# Patient Record
Sex: FEMALE | Race: ASIAN | Employment: UNEMPLOYED | URBAN - METROPOLITAN AREA
[De-identification: names, ages, dates, MRNs, and addresses within clinical notes are randomized per-mention and may not be internally consistent; named-entity substitution may affect disease eponyms.]

---

## 2022-10-13 ENCOUNTER — HOSPITAL ENCOUNTER (OUTPATIENT)
Dept: ULTRASOUND IMAGING | Age: 37
Discharge: HOME | End: 2022-10-13

## 2022-10-13 ENCOUNTER — PRE-PROCEDURE TELEPHONE (OUTPATIENT)
Dept: ULTRASOUND IMAGING | Age: 37
End: 2022-10-13

## 2022-10-13 VITALS
DIASTOLIC BLOOD PRESSURE: 69 MMHG | TEMPERATURE: 98.2 F | RESPIRATION RATE: 18 BRPM | SYSTOLIC BLOOD PRESSURE: 92 MMHG | WEIGHT: 106 LBS | HEART RATE: 91 BPM

## 2022-10-13 DIAGNOSIS — O30.032 MONOCHORIONIC DIAMNIOTIC TWIN GESTATION IN SECOND TRIMESTER: ICD-10-CM

## 2022-10-13 DIAGNOSIS — O30.042 DICHORIONIC DIAMNIOTIC TWIN PREGNANCY IN SECOND TRIMESTER: ICD-10-CM

## 2022-10-13 PROCEDURE — 76821 MIDDLE CEREBRAL ARTERY ECHO: CPT

## 2022-10-13 PROCEDURE — 99211 OFF/OP EST MAY X REQ PHY/QHP: CPT

## 2022-10-13 PROCEDURE — 76816 OB US FOLLOW-UP PER FETUS: CPT

## 2022-10-13 NOTE — FLOWSHEET NOTE
Pt arrives ambulatory to [facility] for scheduled MFM appointment. Pt shown to room. Pt reports feeling both babies move well. Denies any abdominal pain or regular contractions. Denies vaginal bleeding or leaking of fluid. Denies any other OB related concerns. Ob hx, medications, and allergies reviewed.

## 2022-10-13 NOTE — PROGRESS NOTES
This RN called patient at 46- 10/13/22 to notify of patient's follow up appointments for MFM    2 week follow up for MCA doppler and limited scan on 10/27/22 @ 1030 am, at [place] location  4 week follow up with MFM here at [address] on Nov 10 @ 10:00 am  6 week follow up for MCA doppler and limited scan on 11/22/22 at 1030 am at [facility] location. Pt provided with addresses for locations as well. Pt voiced understanding. Denies any concerns.

## 2022-10-13 NOTE — PROCEDURES
[facility]/Women's Imaging Ultrasound Report    Patient name: Mily Dress Age: 39 y.o. : 1985     Date of Service: [id number]    CLEO: Estimated Date of Delivery: None noted. Gestational Age: 32 3/7 weeks     OB Hx: G 2 P 1    Indication: This is a f/u growth and TTTS for monochorionic diamniotic twins. Scan type: Follow- up Growth and MCA Dopplers    Fetus #: 2     Presentation: cephalic/transverse  Fetal Heart Rate: A-155/B-136 bpms   Placental location: posterior  Amniotic fluid: within normal limits             P. Cord Insertion: n/a  Single Deepest Pocket: A-2.46/B-3.78 cm    Biometry:  Baby A  Biparietal Diameter    69.4 mm consistent with 27 weeks and 6 days at the 54th percentile  Head Circumference   248.1 mm consistent with 27 weeks and 0 days at the 10th percentile  Abdominal Circumference  223.1 mm consistent with 26 weeks and 5 days at the 21 percentile  Femur Length    48.7 mm consistent with 26 weeks and 3 days at the 10th percentile  Humerus Length   43.8 mm consistent with 26 weeks and 0 days at the 10th percentile    Baby B    Biparietal Diameter    69.7 mm consistent with 28 weeks and 0 days at the 58th percentile  Head Circumference   254 mm consistent with 27 weeks and 4 days at the 25th percentile  Abdominal Circumference  227.8 mm consistent with 27 weeks and 1 day at the 33rd percentile  Femur Length    49.7 mm consistent with 26 weeks and 5 days at the 18th percentile  Humerus Length   43.7 mm consistent with 26 weeks at the 9th percentile    Fetal weights:            Baby A: 970 g at the 15th percentile   Baby B: 1030 g at the 26 percentile      Anatomy:  Fetal anatomy was previously performed and not repeated today. The calvarium, intracranial anatomy, fetal cavum, lateral ventricle, cerebellum, four-chamber heart, RVOT, LVOT, diaphragm, stomach, bladder, kidneys, and three-vessel cord were again visualized today on baby A and found to be within normal limits. The calvarium, intracranial anatomy, fetal cavum, lateral ventricle, midface, four-chamber heart, RVOT, LVOT, diaphragm, stomach, bladder, and kidneys were visualized again today on baby B and within normal limits. MCA Dopplers are within normal limits for gestational age. A: PSV-32.1 cm/s at 1 MOM  B: PSV-43.2 cm/s at 1.29 MOM        Cervix/Uterus/Adnexa:  Not applicable    There is no evidence of TTTS nor anemia/polycythemia. Fetal growth is appropriate for each twin. Growth is concordant. Twin presentation is cephalic/transverse. Placenta is posterior. Amniotic fluid volume is within normal limits x2 by [facility]. Thank you for sending your patient for f/u ultrasound. Patient will need f/u MCAs in two weeks and twin growth in four weeks.      Sanjuana Holland MD

## 2022-10-13 NOTE — FLOWSHEET NOTE
Pt needed to leave prior to be given discharge instructions or follow up appointment time with [facility] on November 10. Pt will need to be called with those appointment times. In addition, pt will need to be scheduled and seen at [facility] with [facility] for MCA dopplers and limited scan for twin gestation.

## 2022-11-10 ENCOUNTER — HOSPITAL ENCOUNTER (OUTPATIENT)
Dept: ULTRASOUND IMAGING | Age: 37
Discharge: HOME | End: 2022-11-10

## 2022-11-10 VITALS
DIASTOLIC BLOOD PRESSURE: 72 MMHG | OXYGEN SATURATION: 98 % | RESPIRATION RATE: 18 BRPM | SYSTOLIC BLOOD PRESSURE: 102 MMHG | HEART RATE: 123 BPM | TEMPERATURE: 98 F

## 2022-11-10 DIAGNOSIS — O30.033 MONOCHORIONIC DIAMNIOTIC TWIN GESTATION IN THIRD TRIMESTER: ICD-10-CM

## 2022-11-10 PROCEDURE — 76821 MIDDLE CEREBRAL ARTERY ECHO: CPT

## 2022-11-10 PROCEDURE — 76816 OB US FOLLOW-UP PER FETUS: CPT

## 2022-11-10 PROCEDURE — 99211 OFF/OP EST MAY X REQ PHY/QHP: CPT

## 2022-11-10 PROCEDURE — 99213 OFFICE O/P EST LOW 20 MIN: CPT | Performed by: OBSTETRICS & GYNECOLOGY

## 2022-11-10 NOTE — FLOWSHEET NOTE
All discharge instructions reviewed with patient. All questions answered. Pt verbalized understanding. Pt off unit ambulatory with no signs of distress.

## 2022-11-10 NOTE — FLOWSHEET NOTE
Pt arrives to unit for scheduled US with MFM. Pt shown to LT-05. VS obtained. History and medications reviewed. Pt states feeling good fetal movement. Denies vaginal bleeding, leaking of fluid, and abdominal tenderness. Pt has no complaints at this time.

## 2022-11-10 NOTE — PROGRESS NOTES
MATERNAL FETAL MEDICINE  11/10/2022     Referring Clinicians:  Delmi Julio MD and Lesli LARA    Indications:  Monochorionic/diamniotic twins    History: This 43-year-old  2 para 1-0-0-1 has an EDC of 2023 and a EGA of 31 weeks and 3 days. She is referred for monochorionic diamniotic twins. She voices no complaints. She had a prior child 13 months ago. The child weighed 6+ pounds she pushed for 4.5 hours with that pregnancy. Is here today for follow-up growth sonography and middle cerebral artery Doppler studies to make sure there is no evidence of twin-twin transfusion syndrome. Exam: Blood pressure 99/64  Weight 112 pounds  BMI = 23  No costovertebral angle tenderness  Uterus non-tender    Ultrasound Findings: We identified monochorionic/diamniotic twins. The presenting twin was in the vertex presentation and the superior twin was in a transverse lie with the fetal head to the maternal left side. Both twins had adequate amniotic fluid around them, with single deepest vertical pockets of 4.58 cm and 4.05 cm, respectively. Both twins had four-chamber fetal hearts and the heart rates were 157 bpm and 138 bpm, respectively. Both fetuses had three-vessel umbilical cords. Both had stomachs and bladders that were visualized. We performed middle cerebral artery peak systolic velocities, in order to the make certain that there was no evidence of twin-twin transfusion. Both peak systolic velocities were between 1.29 and 1.5 multiples of the median. This is normal.  Biometrics confirmed her EDC and estimated fetal weights were 1576 grams and 1583 grams, respectively. There was a 0.4% difference in estimated fetal weights. Impressions:  Intrauterine pregnancy at 31 weeks 3 days with an [facility] of 2023  2. Monochorionic diamniotic twins  3. Concordant twins  4. According to normal peak systolic velocities of the middle cerebral arteriesof both twins        5. Normal blood pressures        6. Normal amniotic fluid around both twins    Recommendations  1. Prenatal care and delivery with Dr. Mackenzie Byrd  2. Patient to have peak systolic velocities and amniotic fluid pockets checked in [place] in 2 weeks  3. Reassess fetal weights and repeat systolic velocities in 4 weeks in [facility]  4. Patient should have nonstress testing beginning at 32 weeks of gestation. This can be carried out in [facility]. 5.  Delivery plans should be established and carried out by Dr. Mackenzie Byrd.  6. In monochorionic diamniotic twins, we generally encourage delivery around 36 weeks gestation if everything is doing well.

## 2022-12-02 ENCOUNTER — HOSPITAL ENCOUNTER (OUTPATIENT)
Age: 37
Discharge: HOME | End: 2022-12-02
Attending: OBSTETRICS & GYNECOLOGY | Admitting: OBSTETRICS & GYNECOLOGY

## 2022-12-02 VITALS
HEART RATE: 72 BPM | TEMPERATURE: 97.6 F | SYSTOLIC BLOOD PRESSURE: 98 MMHG | RESPIRATION RATE: 15 BRPM | DIASTOLIC BLOOD PRESSURE: 61 MMHG | OXYGEN SATURATION: 100 %

## 2022-12-02 PROCEDURE — 59025 FETAL NON-STRESS TEST: CPT

## 2022-12-02 NOTE — FLOWSHEET NOTE
EFMs and toco off discharge instructions given and signed voiced understanding. [facility] has an appointment on Monday at [facility] and MFM appointment on Thursday. 1020 Left ambulatory from the [facility] with  in stable condition.

## 2022-12-02 NOTE — DISCHARGE INSTRUCTIONS
LABOR    Call your doctor if you have these signs:     Regular tightening of the uterus coming as often as once every 15 minutes     Menstrual-like cramps, they may be regular, or may be continuous and move to   your back. A low, dull backache different from what you normally experience. It may come and go. Vaginal leaking of fluid. Any bleeding from your vagina. Pain, burning or bleeding when you urinate. LABOR    Call your doctor, or come to the hospital, if you have:    Contractions coming about every 10-15 minutes apart, for about one hour. Labor contractions are usually strong enough that you can not walk or talk while having the contractions. ( Contractions can feel like menstrual cramps, tightening in your abdomen, rectal pressure or a backache. This feeling comes and goes.)    Vaginal leaking of fluid. Heavy, bright red bleeding, like the days of your period. ( A little bloody show or spotting is normal in labor.)    ALWAYS CALL YOUR DOCTOR IF YOU:    Notice decreased movement of your baby, different from what you are used to. Have heavy, bright red bleeding, like the heavy days of your periods. Have vaginal leaking of fluid. Keep your next appointment. ____As scheduled_________________________________________    Activity ___As tolerated______________      Diet____As tolerated________________         Learning About Twin Pregnancy  What is different about a twin pregnancy? In many ways, a twin pregnancy is like a single-baby pregnancy. Healthy twins develop like a single baby does until the last trimester, when their growth slows down. Twins are usually born before the usual 40-week due date. For the mother, carrying twins can be more difficult than carrying a single baby. And her risks are higher for pregnancy problems. That's why keeping up with prenatal checks and tests is especially important. How do twins grow?   Twins develop from embryos to babies like a single baby does. By weeks 10 to 14 of your pregnancy, one or two placentas have formed inside your uterus. It is possible to hear your babies' heartbeats with a special ultrasound device. Your babies' eyes can move. Their arms and legs can bend. Around weeks 14 to 18, hair is beginning to grow on your babies' heads. By 18 to 22 weeks, your babies can now suck their thumbs. Around this time, you may start to feel your babies move. At first, this might feel like fluttering or \"butterflies. \"  Around week 26, your babies can open and close their eyelids. You may notice that they respond to the sound of your or your partner's voice. You may also notice that they do less turning and twisting and more squirming. Up to 32 weeks, twins grow rapidly. By this point, they really start to look like babies, with hair and plump skin. Around 32 to 34 weeks, twins' pace of growth slows down. Their lungs become more ready for breathing. This marks a stage when babies born early are less likely to have breathing problems after birth. What can you expect during a twin pregnancy? With a twin pregnancy, your body makes high levels of pregnancy hormones. So morning sickness may come on earlier and stronger than if you were carrying a single baby. You may also have earlier and more intense symptoms from pregnancy, like swelling, heartburn, leg cramps, bladder discomfort, and sleep problems. Your belly may grow bigger, and you may gain more weight, sooner. Talk to your doctor about how much you might expect to gain. When you're carrying twins, you and your babies may be tested and checked more than you would for a single-baby pregnancy. At about 10 weeks, an ultrasound can show if the babies are growing in the same amniotic sac. If they each have their own sac and their own placenta, twins have the best chances of both growing well. Between weeks 18 and 20, an ultrasound may be able to show the sex of your babies.   Later in your pregnancy, you will start to have checkups more often. This will be sooner than for a single-baby pregnancy. Twins tend to be born early, but 37 weeks is a goal when mother and babies are doing well. As you get closer to delivery time, your medical team will help you know what to expect and what to do. As questions come to mind, keep a list so you can remember to ask your doctor. Follow-up care is a key part of your treatment and safety. Be sure to make and go to all appointments, and call your doctor if you are having problems. It's also a good idea to know your test results and keep a list of the medicines you take. Where can you learn more? Go to https://[employer]web[employer]. org and sign in to your [employer] account. Enter Z442 in the [employer] box to learn more about \"Learning About Twin Pregnancy. \"     If you do not have an account, please click on the \"Sign Up Now\" link. Current as of: February 23, 2022               Content Version: 13.4  © 2006-2022 [employer]wise, Incorporated. Care instructions adapted under license by [facility] ([facility]). If you have questions about a medical condition or this instruction, always ask your healthcare professional. [address] any warranty or liability for your use of this information.

## 2022-12-02 NOTE — FLOWSHEET NOTE
Presents ambulatory to the [facility] for NST. Oriented to LT03. TR to DR. Luis Beltre notified of patients arrival for NST with twins. Orders received and for discharge with reactive NST.  0930  EFMs and toco applied FHR A-140 +FM FHR B-145 +FM abdomen soft non tender. Denies feeling contractions at this time. States that she does feel abdomen getting tight but is not feeling pain. OB hx and vs obtained. POC discussed voiced understanding. Call light in reach.

## 2022-12-05 ENCOUNTER — HOSPITAL ENCOUNTER (OUTPATIENT)
Age: 37
Discharge: HOME | End: 2022-12-05
Attending: OBSTETRICS & GYNECOLOGY | Admitting: OBSTETRICS & GYNECOLOGY

## 2022-12-05 VITALS
RESPIRATION RATE: 18 BRPM | OXYGEN SATURATION: 100 % | TEMPERATURE: 98.2 F | HEART RATE: 78 BPM | SYSTOLIC BLOOD PRESSURE: 102 MMHG | DIASTOLIC BLOOD PRESSURE: 55 MMHG

## 2022-12-05 PROCEDURE — 59025 FETAL NON-STRESS TEST: CPT | Performed by: ADVANCED PRACTICE MIDWIFE

## 2022-12-05 PROCEDURE — 59025 FETAL NON-STRESS TEST: CPT

## 2022-12-05 RX ORDER — ONDANSETRON 2 MG/ML
4 INJECTION INTRAMUSCULAR; INTRAVENOUS EVERY 6 HOURS PRN
Status: DISCONTINUED | OUTPATIENT
Start: 2022-12-05 | End: 2022-12-05 | Stop reason: HOSPADM

## 2022-12-05 RX ORDER — ACETAMINOPHEN 325 MG/1
650 TABLET ORAL EVERY 4 HOURS PRN
Status: DISCONTINUED | OUTPATIENT
Start: 2022-12-05 | End: 2022-12-05 | Stop reason: HOSPADM

## 2022-12-05 RX ORDER — ONDANSETRON 4 MG/1
4 TABLET, ORALLY DISINTEGRATING ORAL EVERY 8 HOURS PRN
Status: DISCONTINUED | OUTPATIENT
Start: 2022-12-05 | End: 2022-12-05 | Stop reason: HOSPADM

## 2022-12-05 NOTE — PROGRESS NOTES
Pt admission tab updated, monitors placed. Pt states she feels good fetal movement, denies gush of fluid or vaginal bleeding.

## 2022-12-05 NOTE — PROGRESS NOTES
Department of Obstetrics and Gynecology  Labor and Delivery  TRIAGE NOTE      SUBJECTIVE:    Chief Complaint   Patient presents with    Non-stress Test     Pt reports to triage for NST due to twins and AMA. +FM. Pt denies ctx, VB or LOF. High risk pregnancy for twin gestation and AMA. OBJECTIVE    Vitals:  BP (!) 102/55   Pulse 78   Temp 98.2 °F (36.8 °C) (Oral)   Resp 18   SpO2 100%       CONSTITUTIONAL:  negative  RESPIRATORY:  negative  CARDIOVASCULAR:  negative  GASTROINTESTINAL:  negative  ALLERGIC/IMMUNOLOGIC:  negative  NEUROLOGICAL:  negative  BEHAVIOR/PSYCH:  negative    Fetal heart rate:        Baseline FHR :    135/145 bpm              Fetal Accelerations:  present        Fetal Decelerations:  absent        Fetal Variability:   moderate    Contraction frequency:  Irregular between 3-10 minutes apart     DATA:  No results found for: WBC, HGB, HCT, MCV, PLT    ASSESSMENT:   +NST in third trimester twin pregnancy. Pt on monitor for 20 minutes. PLAN: Pt to be discharged home with PTL precautions.          JANE Lentz CNM

## 2022-12-05 NOTE — DISCHARGE INSTRUCTIONS
OB DISCHARGE INSTRUCTIONS  2022 @ 1:50 PM     Discharge Disposition: *** Home *** May take Tylenol for pain.  Labor  YES  Regular tightening of the uterus coming as often as once every 15 minutes. yes Menstrual-like cramps, they may be regular, or may be continuous and move to your back. YES A low, dull backache different from what you normally experience. It may come and go. YES Vaginal leaking of fluid. YES Any bleeding from your vagina. YES Pain, burning or bleeding when you urinate. Labor  Call your doctor; or come to the hospital, if you have:  YES Contractions coming about every 5 minutes, for about one hour. Labor contractions are usually strong enough that you cannot walk or talk while having contractions. (Contractions can feel like menstrual cramps, tightening in your abdomen, rectal pressure or a backache. This feeling comes and goes.)   YES Vaginal leaking of fluid. YES Heavy, bright red bleeding, like the heavy days of your period.  (A little bloody show is normal in labor.)     Always call your Doctor if you:  YES Notice decreased movement of your baby, different from what you are used to. YES Have heavy, bright red bleeding, like the heavy days of your period. YES Have vaginal leaking of watery fluid. Your next appointment:___Keep your next appointment as scheduled and return to L & D if needed. _________________    Activity:  AS TOLERATED  Diet:  REGULAR  If you have any questions regarding your discharge instructions call us at [phone]  .

## 2022-12-08 ENCOUNTER — HOSPITAL ENCOUNTER (OUTPATIENT)
Dept: ULTRASOUND IMAGING | Age: 37
Discharge: HOME | End: 2022-12-08

## 2022-12-08 VITALS
HEART RATE: 92 BPM | SYSTOLIC BLOOD PRESSURE: 100 MMHG | WEIGHT: 118 LBS | DIASTOLIC BLOOD PRESSURE: 75 MMHG | RESPIRATION RATE: 16 BRPM | OXYGEN SATURATION: 100 % | TEMPERATURE: 97.5 F

## 2022-12-08 DIAGNOSIS — O30.032 MONOCHORIONIC DIAMNIOTIC TWIN GESTATION IN SECOND TRIMESTER: ICD-10-CM

## 2022-12-08 PROCEDURE — 76816 OB US FOLLOW-UP PER FETUS: CPT

## 2022-12-08 PROCEDURE — 76821 MIDDLE CEREBRAL ARTERY ECHO: CPT

## 2022-12-08 NOTE — PROCEDURES
[facility]/Women's Imaging Ultrasound Report    Patient name: Zarina Puckett Age: 39 y.o. : 1985     Date of Service: [id number]    CLEO: Estimated Date of Delivery: 23 Gestational Age: 35w3d OB Hx:     Indication: Mo-Di twins for growth and anemia/polycythemia surveillance    Scan type: Growth/MCAs     Fetus #: 2     Presentation: Vtx/Vtx  Fetal Heart Rate: 143/150   Placental location: Posterior fundal  Amniotic fluid: wnl                                    P. Cord Insertion: non-vis  Single Deepest Pocket: 6.75/3.83cm    Biometry A:  Biparietal Diameter    88.1mm       35 4/7 weeks     62%  Head Circumference   309.1mm     34 4/7 weeks       7%  Abdominal Circumference  302.5mm     34 2/7 weeks     26%  Femur Length    65.9mm       34 0/7 weeks     13%    Lateral Ventricle   6.3mm    Fetal weight:    2398g  40%    Biometry B:  Biparietal Diameter    88.5mm     35 6/7 weeks     68%  Head Circumference   323.1mm   36 4/7 weeks     47%  Abdominal Circumference  310.3mm   35 0/7 weeks     48%  Femur Length    63.3cm       32 5/7 weeks     4 %    Lateral Ventricle   4.0mm    Fetal weight:    2470g  47%    Anatomy:        Fetal anatomy was previously performed and not repeated in entirety today. In a limited survey, the calvarium, lateral ventricle, outflow tracts, diaphragm, stomach, bladder, ventral wall, both kidneys, and three-vessel cord were found to be within normal limits on baby A. Additionally there was a limited four-chamber heart, intracranial anatomy, and midface on baby A. The calvarium, intracranial anatomy, CSP, lateral ventricle, midface, four-chamber heart, outflow tracts, diaphragm, stomach, bladder, antral wall, right kidney and three-vessel cord were seen on baby B. Presentations were cephalic/cephalic. Genders were female.           Other  BPP               Not performed  UA Doppler   Not performed  MCA Doppler   the MCA on baby A had a PSV of 44.5 cm/s that is consistent with greater than 1 multiples of the median; the MCA on baby B had a PSV of 71 cm/s that is consistent with 1.29-1.5 multiples of the median. Both measurements were within normal limits for gestational age. Thank you for sending your patient for an detailed anatomy scan. She is seen for follow-up growth and MCA Dopplers. Biometry and fluid are reassuring for gestational age. Limited anatomy is unremarkable. No further scans currently scheduled given the gestational age and impending delivery. Recommend 2x weekly NSTs until delivery at 36-37 weeks.     Joseph Montoya MD  [facility]

## 2022-12-19 ENCOUNTER — HOSPITAL ENCOUNTER (OUTPATIENT)
Age: 37
Discharge: HOME | DRG: 560 | End: 2022-12-19
Attending: OBSTETRICS & GYNECOLOGY | Admitting: OBSTETRICS & GYNECOLOGY
Payer: MEDICAID

## 2022-12-19 VITALS
BODY MASS INDEX: 25.61 KG/M2 | WEIGHT: 122 LBS | HEIGHT: 58 IN | RESPIRATION RATE: 18 BRPM | TEMPERATURE: 96.4 F | SYSTOLIC BLOOD PRESSURE: 113 MMHG | OXYGEN SATURATION: 100 % | DIASTOLIC BLOOD PRESSURE: 66 MMHG | HEART RATE: 81 BPM

## 2022-12-19 PROCEDURE — 59025 FETAL NON-STRESS TEST: CPT | Performed by: ADVANCED PRACTICE MIDWIFE

## 2022-12-19 PROCEDURE — 59025 FETAL NON-STRESS TEST: CPT

## 2022-12-19 RX ORDER — ACETAMINOPHEN 325 MG/1
650 TABLET ORAL EVERY 4 HOURS PRN
Status: DISCONTINUED | OUTPATIENT
Start: 2022-12-19 | End: 2022-12-19 | Stop reason: HOSPADM

## 2022-12-19 RX ORDER — ONDANSETRON 4 MG/1
4 TABLET, ORALLY DISINTEGRATING ORAL EVERY 8 HOURS PRN
Status: DISCONTINUED | OUTPATIENT
Start: 2022-12-19 | End: 2022-12-19 | Stop reason: HOSPADM

## 2022-12-19 RX ORDER — ONDANSETRON 2 MG/ML
4 INJECTION INTRAMUSCULAR; INTRAVENOUS EVERY 6 HOURS PRN
Status: DISCONTINUED | OUTPATIENT
Start: 2022-12-19 | End: 2022-12-19 | Stop reason: HOSPADM

## 2022-12-19 NOTE — PROGRESS NOTES
Department of Obstetrics and Gynecology  Labor and Delivery  TRIAGE NOTE      SUBJECTIVE:  Non stress test for twin gestation    Pt to triage for NST for high risk pregnancy of twins. +FM. Pt denies VB, LOF. Pt reports contractions every 5-6 minutes, rating 3 out of 10. SVE at last office visit 1cm. OBJECTIVE    Vitals:  BP (!) 114/57   Pulse 75   Temp (!) 96.4 °F (35.8 °C) (Temporal)   Resp 18   Ht 4' 10\" (1.473 m)   Wt 122 lb (55.3 kg)   SpO2 100%   BMI 25.50 kg/m²       CONSTITUTIONAL:  negative  RESPIRATORY:  negative  CARDIOVASCULAR:  negative  GASTROINTESTINAL:  negative  ALLERGIC/IMMUNOLOGIC:  negative  NEUROLOGICAL:  negative  BEHAVIOR/PSYCH:  negative    Membranes:    Intact    Fetal heart rate:        Baseline FHR :    130s/140s bpm              Fetal Accelerations:  present        Fetal Decelerations:  absent        Fetal Variability:   moderate    Contraction frequency:  6 minutes    DATA:  No results found for: WBC, HGB, HCT, MCV, PLT    +NST    ASSESSMENT:   NST for twin pregnancy      PLAN: SVE to assess if patient in labor. Dr. Sena Luke aware.         Oskar Diego, APRN - [place]NAM

## 2022-12-19 NOTE — PROGRESS NOTES
Pt arrives to triage for scheduled NST for [place]-Di Twins. Pt reports positive fetal movement, denies any bleeding or leaking of fluid, and abd soft and nontender to palpation. Pt repots some ctx, not timeable. FHMs and TOCO applied, POC reviewed, call light within reach.

## 2022-12-21 ENCOUNTER — ANESTHESIA EVENT (OUTPATIENT)
Dept: LABOR AND DELIVERY | Age: 37
End: 2022-12-21
Payer: MEDICAID

## 2022-12-21 ENCOUNTER — ANESTHESIA (OUTPATIENT)
Dept: LABOR AND DELIVERY | Age: 37
End: 2022-12-21
Payer: MEDICAID

## 2022-12-21 ENCOUNTER — HOSPITAL ENCOUNTER (INPATIENT)
Age: 37
LOS: 2 days | Discharge: HOME | DRG: 560 | End: 2022-12-23
Attending: OBSTETRICS & GYNECOLOGY | Admitting: OBSTETRICS & GYNECOLOGY
Payer: MEDICAID

## 2022-12-21 PROBLEM — Z3A.37 37 WEEKS GESTATION OF PREGNANCY: Status: ACTIVE | Noted: 2022-12-21

## 2022-12-21 PROBLEM — O09.93 SUPERVISION OF HIGH RISK PREGNANCY IN THIRD TRIMESTER: Status: ACTIVE | Noted: 2022-12-21

## 2022-12-21 PROBLEM — O30.009 PREGNANCY, TWINS, DELIVERED: Status: ACTIVE | Noted: 2022-12-21

## 2022-12-21 LAB
ABO/RH: NORMAL
AMPHETAMINES: NEGATIVE
ANTIBODY SCREEN: NEGATIVE
BARBITURATE SCREEN URINE: NEGATIVE
BENZODIAZEPINE SCREEN, URINE: NEGATIVE
CANNABINOID SCREEN URINE: NEGATIVE
COCAINE METABOLITE: NEGATIVE
GLUCOSE BLD-MCNC: 113 MG/DL (ref 70–99)
HCT VFR BLD CALC: 41.8 % (ref 37–47)
HEMOGLOBIN: 13.7 GM/DL (ref 12.5–16)
MCH RBC QN AUTO: 31.5 PG (ref 27–31)
MCHC RBC AUTO-ENTMCNC: 32.8 % (ref 32–36)
MCV RBC AUTO: 96.1 FL (ref 78–100)
OPIATES, URINE: NEGATIVE
OXYCODONE: NEGATIVE
PDW BLD-RTO: 12.3 % (ref 11.7–14.9)
PHENCYCLIDINE, URINE: NEGATIVE
PLATELET # BLD: 155 K/CU MM (ref 140–440)
PMV BLD AUTO: 10.9 FL (ref 7.5–11.1)
RBC # BLD: 4.35 M/CU MM (ref 4.2–5.4)
WBC # BLD: 10.6 K/CU MM (ref 4–10.5)

## 2022-12-21 PROCEDURE — 82962 GLUCOSE BLOOD TEST: CPT

## 2022-12-21 PROCEDURE — 3700000025 EPIDURAL BLOCK: Performed by: NURSE ANESTHETIST, CERTIFIED REGISTERED

## 2022-12-21 PROCEDURE — 59409 OBSTETRICAL CARE: CPT | Performed by: OBSTETRICS & GYNECOLOGY

## 2022-12-21 PROCEDURE — 6360000002 HC RX W HCPCS

## 2022-12-21 PROCEDURE — 86900 BLOOD TYPING SEROLOGIC ABO: CPT

## 2022-12-21 PROCEDURE — 0HQ9XZZ REPAIR PERINEUM SKIN, EXTERNAL APPROACH: ICD-10-PCS | Performed by: OBSTETRICS & GYNECOLOGY

## 2022-12-21 PROCEDURE — 51702 INSERT TEMP BLADDER CATH: CPT

## 2022-12-21 PROCEDURE — 80307 DRUG TEST PRSMV CHEM ANLYZR: CPT

## 2022-12-21 PROCEDURE — 6360000002 HC RX W HCPCS: Performed by: NURSE ANESTHETIST, CERTIFIED REGISTERED

## 2022-12-21 PROCEDURE — 2580000003 HC RX 258

## 2022-12-21 PROCEDURE — 88307 TISSUE EXAM BY PATHOLOGIST: CPT | Performed by: PATHOLOGY

## 2022-12-21 PROCEDURE — 1220000000 HC SEMI PRIVATE OB R&B

## 2022-12-21 PROCEDURE — 7200000001 HC VAGINAL DELIVERY

## 2022-12-21 PROCEDURE — 86901 BLOOD TYPING SEROLOGIC RH(D): CPT

## 2022-12-21 PROCEDURE — 86850 RBC ANTIBODY SCREEN: CPT

## 2022-12-21 PROCEDURE — 85027 COMPLETE CBC AUTOMATED: CPT

## 2022-12-21 RX ORDER — FENTANYL CITRATE 50 UG/ML
100 INJECTION, SOLUTION INTRAMUSCULAR; INTRAVENOUS
Status: DISCONTINUED | OUTPATIENT
Start: 2022-12-21 | End: 2022-12-21

## 2022-12-21 RX ORDER — SODIUM CHLORIDE 0.9 % (FLUSH) 0.9 %
5-40 SYRINGE (ML) INJECTION EVERY 12 HOURS SCHEDULED
Status: DISCONTINUED | OUTPATIENT
Start: 2022-12-21 | End: 2022-12-21

## 2022-12-21 RX ORDER — FAMOTIDINE 10 MG/ML
20 INJECTION, SOLUTION INTRAVENOUS 2 TIMES DAILY PRN
Status: DISCONTINUED | OUTPATIENT
Start: 2022-12-21 | End: 2022-12-21

## 2022-12-21 RX ORDER — LANOLIN 100 %
OINTMENT (GRAM) TOPICAL PRN
Status: DISCONTINUED | OUTPATIENT
Start: 2022-12-21 | End: 2022-12-23 | Stop reason: HOSPADM

## 2022-12-21 RX ORDER — SODIUM CHLORIDE 9 MG/ML
25 INJECTION, SOLUTION INTRAVENOUS PRN
Status: DISCONTINUED | OUTPATIENT
Start: 2022-12-21 | End: 2022-12-21

## 2022-12-21 RX ORDER — SIMETHICONE 80 MG
80 TABLET,CHEWABLE ORAL EVERY 6 HOURS PRN
Status: DISCONTINUED | OUTPATIENT
Start: 2022-12-21 | End: 2022-12-23 | Stop reason: HOSPADM

## 2022-12-21 RX ORDER — LIDOCAINE HYDROCHLORIDE 10 MG/ML
30 INJECTION, SOLUTION EPIDURAL; INFILTRATION; INTRACAUDAL; PERINEURAL PRN
Status: DISCONTINUED | OUTPATIENT
Start: 2022-12-21 | End: 2022-12-21

## 2022-12-21 RX ORDER — METHYLERGONOVINE MALEATE 0.2 MG/ML
200 INJECTION INTRAVENOUS PRN
Status: DISCONTINUED | OUTPATIENT
Start: 2022-12-21 | End: 2022-12-23 | Stop reason: HOSPADM

## 2022-12-21 RX ORDER — OXYCODONE HYDROCHLORIDE 5 MG/1
5 TABLET ORAL EVERY 4 HOURS PRN
Status: DISCONTINUED | OUTPATIENT
Start: 2022-12-21 | End: 2022-12-23 | Stop reason: HOSPADM

## 2022-12-21 RX ORDER — ACETAMINOPHEN 500 MG
1000 TABLET ORAL EVERY 8 HOURS
Status: DISCONTINUED | OUTPATIENT
Start: 2022-12-21 | End: 2022-12-23 | Stop reason: HOSPADM

## 2022-12-21 RX ORDER — CARBOPROST TROMETHAMINE 250 UG/ML
250 INJECTION, SOLUTION INTRAMUSCULAR PRN
Status: DISCONTINUED | OUTPATIENT
Start: 2022-12-21 | End: 2022-12-23 | Stop reason: HOSPADM

## 2022-12-21 RX ORDER — MISOPROSTOL 200 UG/1
800 TABLET ORAL PRN
Status: DISCONTINUED | OUTPATIENT
Start: 2022-12-21 | End: 2022-12-23 | Stop reason: HOSPADM

## 2022-12-21 RX ORDER — SODIUM CHLORIDE, SODIUM LACTATE, POTASSIUM CHLORIDE, CALCIUM CHLORIDE 600; 310; 30; 20 MG/100ML; MG/100ML; MG/100ML; MG/100ML
1000 INJECTION, SOLUTION INTRAVENOUS PRN
Status: DISCONTINUED | OUTPATIENT
Start: 2022-12-21 | End: 2022-12-21

## 2022-12-21 RX ORDER — ONDANSETRON 4 MG/1
4 TABLET, ORALLY DISINTEGRATING ORAL EVERY 6 HOURS PRN
Status: DISCONTINUED | OUTPATIENT
Start: 2022-12-21 | End: 2022-12-23 | Stop reason: HOSPADM

## 2022-12-21 RX ORDER — SODIUM CHLORIDE 0.9 % (FLUSH) 0.9 %
5-40 SYRINGE (ML) INJECTION PRN
Status: DISCONTINUED | OUTPATIENT
Start: 2022-12-21 | End: 2022-12-23 | Stop reason: HOSPADM

## 2022-12-21 RX ORDER — ROPIVACAINE HYDROCHLORIDE 2 MG/ML
10 INJECTION, SOLUTION EPIDURAL; INFILTRATION; PERINEURAL CONTINUOUS
Status: DISCONTINUED | OUTPATIENT
Start: 2022-12-21 | End: 2022-12-21

## 2022-12-21 RX ORDER — ROPIVACAINE HYDROCHLORIDE 2 MG/ML
INJECTION, SOLUTION EPIDURAL; INFILTRATION; PERINEURAL PRN
Status: DISCONTINUED | OUTPATIENT
Start: 2022-12-21 | End: 2022-12-21 | Stop reason: SDUPTHER

## 2022-12-21 RX ORDER — SODIUM CHLORIDE, SODIUM LACTATE, POTASSIUM CHLORIDE, CALCIUM CHLORIDE 600; 310; 30; 20 MG/100ML; MG/100ML; MG/100ML; MG/100ML
INJECTION, SOLUTION INTRAVENOUS CONTINUOUS
Status: DISCONTINUED | OUTPATIENT
Start: 2022-12-21 | End: 2022-12-21

## 2022-12-21 RX ORDER — SODIUM CHLORIDE, SODIUM LACTATE, POTASSIUM CHLORIDE, CALCIUM CHLORIDE 600; 310; 30; 20 MG/100ML; MG/100ML; MG/100ML; MG/100ML
500 INJECTION, SOLUTION INTRAVENOUS PRN
Status: DISCONTINUED | OUTPATIENT
Start: 2022-12-21 | End: 2022-12-21

## 2022-12-21 RX ORDER — METHYLERGONOVINE MALEATE 0.2 MG/ML
200 INJECTION INTRAVENOUS PRN
Status: DISCONTINUED | OUTPATIENT
Start: 2022-12-21 | End: 2022-12-21

## 2022-12-21 RX ORDER — TRANEXAMIC ACID 10 MG/ML
1000 INJECTION, SOLUTION INTRAVENOUS
Status: DISCONTINUED | OUTPATIENT
Start: 2022-12-21 | End: 2022-12-21

## 2022-12-21 RX ORDER — CARBOPROST TROMETHAMINE 250 UG/ML
250 INJECTION, SOLUTION INTRAMUSCULAR PRN
Status: DISCONTINUED | OUTPATIENT
Start: 2022-12-21 | End: 2022-12-21

## 2022-12-21 RX ORDER — SODIUM CHLORIDE 0.9 % (FLUSH) 0.9 %
5-40 SYRINGE (ML) INJECTION PRN
Status: DISCONTINUED | OUTPATIENT
Start: 2022-12-21 | End: 2022-12-21

## 2022-12-21 RX ORDER — MISOPROSTOL 200 UG/1
800 TABLET ORAL PRN
Status: DISCONTINUED | OUTPATIENT
Start: 2022-12-21 | End: 2022-12-21

## 2022-12-21 RX ORDER — NALOXONE HYDROCHLORIDE 0.4 MG/ML
INJECTION, SOLUTION INTRAMUSCULAR; INTRAVENOUS; SUBCUTANEOUS PRN
Status: DISCONTINUED | OUTPATIENT
Start: 2022-12-21 | End: 2022-12-21

## 2022-12-21 RX ORDER — SODIUM CHLORIDE 9 MG/ML
INJECTION, SOLUTION INTRAVENOUS PRN
Status: DISCONTINUED | OUTPATIENT
Start: 2022-12-21 | End: 2022-12-23 | Stop reason: HOSPADM

## 2022-12-21 RX ORDER — IBUPROFEN 800 MG/1
800 TABLET ORAL EVERY 8 HOURS
Status: DISCONTINUED | OUTPATIENT
Start: 2022-12-21 | End: 2022-12-23 | Stop reason: HOSPADM

## 2022-12-21 RX ORDER — FAMOTIDINE 20 MG/1
20 TABLET, FILM COATED ORAL 2 TIMES DAILY PRN
Status: DISCONTINUED | OUTPATIENT
Start: 2022-12-21 | End: 2022-12-23 | Stop reason: HOSPADM

## 2022-12-21 RX ORDER — ONDANSETRON 2 MG/ML
4 INJECTION INTRAMUSCULAR; INTRAVENOUS EVERY 6 HOURS PRN
Status: DISCONTINUED | OUTPATIENT
Start: 2022-12-21 | End: 2022-12-21

## 2022-12-21 RX ORDER — OXYCODONE HYDROCHLORIDE 5 MG/1
10 TABLET ORAL EVERY 4 HOURS PRN
Status: DISCONTINUED | OUTPATIENT
Start: 2022-12-21 | End: 2022-12-23 | Stop reason: HOSPADM

## 2022-12-21 RX ORDER — DOCUSATE SODIUM 100 MG/1
100 CAPSULE, LIQUID FILLED ORAL 2 TIMES DAILY
Status: DISCONTINUED | OUTPATIENT
Start: 2022-12-21 | End: 2022-12-23 | Stop reason: HOSPADM

## 2022-12-21 RX ADMIN — ONDANSETRON 4 MG: 2 INJECTION INTRAMUSCULAR; INTRAVENOUS at 20:40

## 2022-12-21 RX ADMIN — ROPIVACAINE HYDROCHLORIDE 8 ML: 2 INJECTION, SOLUTION EPIDURAL; INFILTRATION; PERINEURAL at 11:49

## 2022-12-21 RX ADMIN — METHYLERGONOVINE MALEATE 200 MCG: 0.2 INJECTION, SOLUTION INTRAMUSCULAR; INTRAVENOUS at 19:40

## 2022-12-21 RX ADMIN — SODIUM CHLORIDE, POTASSIUM CHLORIDE, SODIUM LACTATE AND CALCIUM CHLORIDE: 600; 310; 30; 20 INJECTION, SOLUTION INTRAVENOUS at 11:55

## 2022-12-21 RX ADMIN — Medication 1 MILLI-UNITS/MIN: at 15:55

## 2022-12-21 RX ADMIN — ROPIVACAINE HYDROCHLORIDE 10 ML: 2 INJECTION, SOLUTION EPIDURAL; INFILTRATION; PERINEURAL at 11:52

## 2022-12-21 RX ADMIN — Medication 166.7 ML: at 19:17

## 2022-12-21 ASSESSMENT — PAIN DESCRIPTION - ORIENTATION
ORIENTATION: MID
ORIENTATION: LOWER;MID

## 2022-12-21 ASSESSMENT — PAIN DESCRIPTION - PAIN TYPE
TYPE: ACUTE PAIN
TYPE: ACUTE PAIN

## 2022-12-21 ASSESSMENT — PAIN - FUNCTIONAL ASSESSMENT
PAIN_FUNCTIONAL_ASSESSMENT: ACTIVITIES ARE NOT PREVENTED
PAIN_FUNCTIONAL_ASSESSMENT: ACTIVITIES ARE NOT PREVENTED

## 2022-12-21 ASSESSMENT — PAIN SCALES - GENERAL
PAINLEVEL_OUTOF10: 4
PAINLEVEL_OUTOF10: 2

## 2022-12-21 ASSESSMENT — PAIN DESCRIPTION - ONSET
ONSET: GRADUAL
ONSET: GRADUAL

## 2022-12-21 ASSESSMENT — PAIN DESCRIPTION - FREQUENCY
FREQUENCY: INTERMITTENT
FREQUENCY: CONTINUOUS

## 2022-12-21 ASSESSMENT — PAIN DESCRIPTION - DESCRIPTORS
DESCRIPTORS: CRAMPING;SORE
DESCRIPTORS: CRAMPING;SORE

## 2022-12-21 ASSESSMENT — PAIN DESCRIPTION - LOCATION
LOCATION: ABDOMEN
LOCATION: ABDOMEN

## 2022-12-21 NOTE — H&P
Department of Obstetrics and Gynecology   Obstetrics History and Physical        CHIEF COMPLAINT:         HISTORY OF PRESENT ILLNESS:      The patient is a 39 y.o. female at 42w2d. OB History          2    Para   1    Term   1            AB   0    Living   1         SAB   0    IAB        Ectopic        Molar        Multiple        Live Births   1            Patient presents with a chief complaint as above and is being admitted for induction and Mono/Di Twins    Estimated Due Date: Estimated Date of Delivery: 23    PRENATAL CARE:    Complicated by: mono/Di Twins Pregnancy, elevated 1 hour Glucose    PAST OB HISTORY  OB History          2    Para   1    Term   1            AB   0    Living   1         SAB   0    IAB        Ectopic        Molar        Multiple        Live Births   1                Past Medical History:    No past medical history on file. Past Surgical History:    No past surgical history on file. Allergies:  Patient has no known allergies. Social History:    Social History     Socioeconomic History    Marital status:      Spouse name: Not on file    Number of children: Not on file    Years of education: Not on file    Highest education level: Not on file   Occupational History    Not on file   Tobacco Use    Smoking status: Never    Smokeless tobacco: Never   Vaping Use    Vaping Use: Never used   Substance and Sexual Activity    Alcohol use: Not Currently    Drug use: Never    Sexual activity: Yes   Other Topics Concern    Not on file   Social History Narrative    Not on file     Social Determinants of Health     Financial Resource Strain: Not on file   Food Insecurity: Not on file   Transportation Needs: Not on file   Physical Activity: Not on file   Stress: Not on file   Social Connections: Not on file   Intimate Partner Violence: Not on file   Housing Stability: Not on file     Family History:   No family history on file.   Medications Prior to Admission:  Medications Prior to Admission: Prenatal MV-Min-Fe Fum-FA-DHA (PRENATAL 1 PO), Take by mouth    REVIEW OF SYSTEMS:    CONSTITUTIONAL:  negative  RESPIRATORY:  negative  CARDIOVASCULAR:  negative  GASTROINTESTINAL:  negative  ALLERGIC/IMMUNOLOGIC:  negative  NEUROLOGICAL:  negative  BEHAVIOR/PSYCH:  negative    PHYSICAL EXAM:      Blood Type- O+  GBS- Negative  Rubella- immune  HIV- non-reactive  Hep B- Negative  RPR- non-reactive  GC/C- negative/negative        General appearance:  awake, alert, cooperative, no apparent distress, and appears stated age  Neurologic:  Awake, alert, oriented to name, place and time. Lungs:  No increased work of breathing, good air exchange  Abdomen:  Soft, non tender, gravid, consistent with her gestational age,     Fetal heart rate Baby A:    Baseline Heart Rate:  130        Accelerations:  present       Variability:  moderate       Decelerations:  early        Fetal heart rate Baby B:    Baseline Heart Rate:  145        Accelerations:  present       Variability:  moderate       Decelerations:  variable           Pelvis:  Adequate pelvis    Cervix: 6 cm 80 soft -2      Contraction frequency:  5 minutes    Membranes:  Intact    ASSESSMENT AND PLAN:    Labor: Admit, anticipate normal delivery, routine labor orders  Fetus: Reassuring  GBS:negative  Other: pitocin, R, B, A and possible complications discussed, Epidural If needed      I have collaborated and updated Dr. Yara Bowling  and the MD agrees with the current POC.     JANE Morris CNM

## 2022-12-21 NOTE — ANESTHESIA PRE PROCEDURE
Department of Anesthesiology  Preprocedure Note       Name:  Shannon Tsai   Age:  39 y.o.  :  1985                                          MRN:  [id number]         Date:  2022      Surgeon: * No surgeons listed *    Procedure: * No procedures listed *    Medications prior to admission:   Prior to Admission medications    Medication Sig Start Date End Date Taking?  Authorizing Provider   Prenatal MV-Min-Fe Fum-FA-DHA (PRENATAL 1 PO) Take by mouth    Historical Provider, MD       Current medications:    Current Facility-Administered Medications   Medication Dose Route Frequency Provider Last Rate Last Admin    lactated ringers infusion   IntraVENous Continuous Jennifer Perks, APRN -  mL/hr at 22 1155 New Bag at 22 1155    lactated ringers infusion 500 mL  500 mL IntraVENous PRN Jennifer Perks, APRN - CNM        Or    lactated ringers infusion 1,000 mL  1,000 mL IntraVENous PRN Jennifer Perks, APRN - CNM        sodium chloride flush 0.9 % injection 5-40 mL  5-40 mL IntraVENous 2 times per day Jennifer Perks, APRN - CNM        sodium chloride flush 0.9 % injection 5-40 mL  5-40 mL IntraVENous PRN Jennifer Perks, APRN - CNM        0.9 % sodium chloride infusion  25 mL IntraVENous PRN Jennifer Perks, APRN - CNM        oxytocin (PITOCIN) 30 units in 500 mL infusion  1-20 sheila-units/min IntraVENous Continuous Jennifer Perks, APRN - CNM        methylergonovine (METHERGINE) injection 200 mcg  200 mcg IntraMUSCular PRN Jennifer Perks, APRN - CNM        carboprost (HEMABATE) injection 250 mcg  250 mcg IntraMUSCular PRN Jennifer Perks, APRN - CNM        miSOPROStol (CYTOTEC) tablet 800 mcg  800 mcg Rectal PRN Jennifer Perks, APRN - CNM        tranexamic acid-NaCl IVPB premix 1,000 mg  1,000 mg IntraVENous Once PRN Jennifer Perks, APRN - CNM        oxytocin (PITOCIN) 30 units in 500 mL infusion  87.3 sheila-units/min IntraVENous Continuous PRN Jennifer Perks, APRN - CNM        And    oxytocin (PITOCIN) 10 unit bolus from the bag  10 Units IntraVENous PRN Sherryll Medico, APRN - CNM        lidocaine PF 1 % injection 30 mL  30 mL Other PRN Sherryll Medico, APRN - CNM        fentaNYL (SUBLIMAZE) injection 100 mcg  100 mcg IntraVENous Q1H PRN Sherryll Medico, APRN - CNM        famotidine (PEPCID) injection 20 mg  20 mg IntraVENous BID PRN Sherryll Medico, APRN - CNM        [facility]ron [facility]) injection 4 mg  4 mg IntraVENous Q6H PRN Sherryll Medico, APRN - CNM           Allergies:  No Known Allergies    Problem List:    Patient Active Problem List   Diagnosis Code    Labor and delivery, indication for care O75.9    Labor and delivery indication for care or intervention O75.9    Supervision of high risk pregnancy in third trimester O09.93       Past Medical History:        Diagnosis Date    Diabetes mellitus ([facility] [address]        Past Surgical History:  History reviewed. No pertinent surgical history. Social History:    Social History     Tobacco Use    Smoking status: Never    Smokeless tobacco: Never   Substance Use Topics    Alcohol use: Not Currently                                Counseling given: Not Answered      Vital Signs (Current):   Vitals:    12/21/22 1109   BP: 120/72   Pulse: 78   Resp: 18   Temp: 36.7 °C (98 °F)   TempSrc: Oral   SpO2: 99%   Weight: 122 lb (55.3 kg)   Height: 4' 10\" (1.473 m)                                              BP Readings from Last 3 Encounters:   12/21/22 120/72   12/19/22 113/66   12/08/22 100/75       NPO Status:                                                                                 BMI:   Wt Readings from Last 3 Encounters:   12/21/22 122 lb (55.3 kg)   12/19/22 122 lb (55.3 kg)   12/08/22 118 lb (53.5 kg)     Body mass index is 25.5 kg/m².     CBC:   Lab Results   Component Value Date/Time    WBC 10.6 12/21/2022 11:00 AM    RBC 4.35 12/21/2022 11:00 AM    HGB 13.7 12/21/2022 11:00 AM    HCT 41.8 12/21/2022 11:00 AM    MCV 96.1 12/21/2022 11:00 AM    RDW 12.3 12/21/2022 11:00 AM     12/21/2022 11:00 AM       CMP: No results found for: NA, K, CL, CO2, BUN, CREATININE, GFRAA, AGRATIO, LABGLOM, GLUCOSE, GLU, PROT, CALCIUM, BILITOT, ALKPHOS, AST, ALT    POC Tests: No results for input(s): POCGLU, POCNA, POCK, POCCL, POCBUN, POCHEMO, POCHCT in the last 72 hours. Coags: No results found for: PROTIME, INR, APTT    HCG (If Applicable): No results found for: PREGTESTUR, PREGSERUM, HCG, HCGQUANT     ABGs: No results found for: PHART, PO2ART, JPK0RUF, OII3XEK, BEART, S0PNSSIE     Type & Screen (If Applicable):  No results found for: LABABO, LABRH    Drug/Infectious Status (If Applicable):  No results found for: HIV, HEPCAB    COVID-19 Screening (If Applicable): No results found for: COVID19        Anesthesia Evaluation  Patient summary reviewed and Nursing notes reviewed  Airway: Mallampati: II  TM distance: >3 FB   Neck ROM: full  Mouth opening: > = 3 FB   Dental: normal exam         Pulmonary:Negative Pulmonary ROS and normal exam                               Cardiovascular:Negative CV ROS                      Neuro/Psych:   Negative Neuro/Psych ROS              GI/Hepatic/Renal: Neg GI/Hepatic/Renal ROS            Endo/Other:    (+) DiabetesType II DM, using insulin, . Abdominal:             Vascular: negative vascular ROS. Other Findings:           Anesthesia Plan      epidural     ASA 2             Anesthetic plan and risks discussed with patient.                         JANE Maza - CRNA   12/21/2022

## 2022-12-21 NOTE — FLOWSHEET NOTE
Presents ambulatory to the [facility] for scheduled induction. Oriented to LD05 and instructed to obtain ccua and change into a gown. Denies vaginal bleeding or leaking of fluid.

## 2022-12-21 NOTE — PROGRESS NOTES
Nina Pringle CRNA in room for placement of epidural per patient's request. Procedure r/b discussed with patient and consent signed. EFM moved while patient in position for epidural. Nurse remained by the patient and monitored vitals throughout the procedure. 1139 Time-out preformed. 1144 Epidural placed. 1146 Test dose given. 1150 Bolus given. Patient tolerated procedure well and EFM readjusted. Baseline FHR Baby A 130. Baseline FHR Baby B 130.

## 2022-12-21 NOTE — PLAN OF CARE
Problem: Vaginal Birth or  Section  Goal: Fetal and maternal status remain reassuring during the birth process  Description:  Birth OB-Pregnancy care plan goal which identifies if the fetal and maternal status remain reassuring during the birth process  Outcome: Progressing     Problem: Pain  Goal: Verbalizes/displays adequate comfort level or baseline comfort level  Outcome: Progressing  Flowsheets (Taken 2022 1109)  Verbalizes/displays adequate comfort level or baseline comfort level:   Encourage patient to monitor pain and request assistance   Assess pain using appropriate pain scale   Administer analgesics based on type and severity of pain and evaluate response   Implement non-pharmacological measures as appropriate and evaluate response   Consider cultural and social influences on pain and pain management   Notify Licensed Independent Practitioner if interventions unsuccessful or patient reports new pain     Problem: Infection - Adult  Goal: Absence of infection at discharge  Outcome: Progressing  Goal: Absence of infection during hospitalization  Outcome: Progressing  Goal: Absence of fever/infection during anticipated neutropenic period  Outcome: Progressing     Problem: Safety - Adult  Goal: Free from fall injury  Outcome: Progressing     Problem: Chronic Conditions and Co-morbidities  Goal: Patient's chronic conditions and co-morbidity symptoms are monitored and maintained or improved  Outcome: Progressing

## 2022-12-21 NOTE — ANESTHESIA PROCEDURE NOTES
Epidural Block    Patient location during procedure: OB  Start time: 12/21/2022 11:35 AM  End time: 12/21/2022 11:58 AM  Reason for block: labor epidural  Staffing  Resident/CRNA: Matthew Arteaga APRN - REGGIE  Epidural  Patient position: sitting  Prep: ChloraPrep and site prepped and draped  Patient monitoring: continuous pulse ox and frequent blood pressure checks  Approach: midline  Location: L3-4  Injection technique: OSWALDO saline  Provider prep: sterile gloves and mask  Needle  Needle type: [employer]   Needle gauge: 18 G  Needle length: 3.5 in  Needle insertion depth: 3 cm  Catheter type: side hole  Catheter size: 18 G  Catheter at skin depth: 10 cm  Test dose: negativeCatheter Secured: tegaderm and tape  Assessment  Sensory level: T8  Hemodynamics: stable  Attempts: 1  Outcomes: uncomplicated and patient tolerated procedure well  Additional Notes  Test dose with 3ml lidocaine 1.5% with epi 1:200,000.      Preanesthetic Checklist  Completed: patient identified, IV checked, site marked, risks and benefits discussed, surgical/procedural consents, equipment checked, pre-op evaluation, timeout performed, anesthesia consent given, oxygen available, monitors applied/VS acknowledged, fire risk safety assessment completed and verbalized and blood product R/B/A discussed and consented

## 2022-12-22 PROCEDURE — 6370000000 HC RX 637 (ALT 250 FOR IP)

## 2022-12-22 PROCEDURE — 1220000000 HC SEMI PRIVATE OB R&B

## 2022-12-22 RX ORDER — NICOTINE 21 MG/24HR
1 PATCH, TRANSDERMAL 24 HOURS TRANSDERMAL DAILY
Status: DISCONTINUED | OUTPATIENT
Start: 2022-12-22 | End: 2022-12-23 | Stop reason: HOSPADM

## 2022-12-22 RX ORDER — SODIUM CHLORIDE 0.9 % (FLUSH) 0.9 %
5-40 SYRINGE (ML) INJECTION EVERY 12 HOURS SCHEDULED
Status: DISCONTINUED | OUTPATIENT
Start: 2022-12-22 | End: 2022-12-23 | Stop reason: HOSPADM

## 2022-12-22 RX ADMIN — IBUPROFEN 800 MG: 800 TABLET, FILM COATED ORAL at 19:00

## 2022-12-22 RX ADMIN — IBUPROFEN 800 MG: 800 TABLET, FILM COATED ORAL at 09:59

## 2022-12-22 RX ADMIN — DOCUSATE SODIUM 100 MG: 100 CAPSULE, LIQUID FILLED ORAL at 09:59

## 2022-12-22 ASSESSMENT — PAIN DESCRIPTION - FREQUENCY
FREQUENCY: CONTINUOUS
FREQUENCY: CONTINUOUS

## 2022-12-22 ASSESSMENT — PAIN DESCRIPTION - LOCATION
LOCATION: ABDOMEN
LOCATION: ABDOMEN;BACK;RIB CAGE
LOCATION: ABDOMEN;BACK;RIB CAGE

## 2022-12-22 ASSESSMENT — PAIN DESCRIPTION - PAIN TYPE
TYPE: ACUTE PAIN
TYPE: ACUTE PAIN

## 2022-12-22 ASSESSMENT — PAIN SCALES - GENERAL
PAINLEVEL_OUTOF10: 3
PAINLEVEL_OUTOF10: 3
PAINLEVEL_OUTOF10: 1

## 2022-12-22 ASSESSMENT — PAIN DESCRIPTION - DESCRIPTORS
DESCRIPTORS: CRAMPING;ACHING
DESCRIPTORS: CRAMPING;ACHING
DESCRIPTORS: CRAMPING

## 2022-12-22 ASSESSMENT — PAIN - FUNCTIONAL ASSESSMENT
PAIN_FUNCTIONAL_ASSESSMENT: ACTIVITIES ARE NOT PREVENTED

## 2022-12-22 ASSESSMENT — PAIN DESCRIPTION - ORIENTATION
ORIENTATION: MID
ORIENTATION: MID
ORIENTATION: LOWER

## 2022-12-22 ASSESSMENT — PAIN DESCRIPTION - ONSET
ONSET: ON-GOING
ONSET: ON-GOING

## 2022-12-22 NOTE — PLAN OF CARE
Problem: Pain  Goal: Verbalizes/displays adequate comfort level or baseline comfort level  Outcome: Progressing     Problem: Infection - Adult  Goal: Absence of infection at discharge  Outcome: Progressing  Goal: Absence of infection during hospitalization  Outcome: Progressing  Goal: Absence of fever/infection during anticipated neutropenic period  Outcome: Progressing     Problem: Safety - Adult  Goal: Free from fall injury  Outcome: Progressing     Problem: Chronic Conditions and Co-morbidities  Goal: Patient's chronic conditions and co-morbidity symptoms are monitored and maintained or improved  Outcome: Progressing

## 2022-12-22 NOTE — ANESTHESIA POSTPROCEDURE EVALUATION
Department of Anesthesiology  Postprocedure Note    Patient: Kelly Degroot  MRN: [id number]  YOB: 1985  Date of evaluation: 12/21/2022      Procedure Summary     Date: 12/21/22 Room / Location:     Anesthesia Start: 1139 Anesthesia Stop: 1913    Procedure: Labor Analgesia Diagnosis:     Scheduled Providers:  Responsible Provider: JANE Barragan CRNA    Anesthesia Type: epidural ASA Status: 2          Anesthesia Type: No value filed.     Niesha Phase I: Niesha Score: 10    Niesha Phase II:        Anesthesia Post Evaluation    Patient location during evaluation: bedside  Patient participation: complete - patient participated  Level of consciousness: awake and alert  Pain score: 1  Airway patency: patent  Nausea & Vomiting: no nausea  Complications: no  Cardiovascular status: blood pressure returned to baseline and hemodynamically stable  Respiratory status: acceptable, room air and spontaneous ventilation  Hydration status: euvolemic

## 2022-12-22 NOTE — LACTATION NOTE
Visited. Mom says she just breast fed her twins. She says she thinks they are breast feeding ok. Mom discusses her breast feeding history with her first baby(17 mo old). She says she dis not have assistance with breast feeding- or teaching. She experienced sore nipples, with cracks and blisters. Because of the soreness, she began pumping and fed EBM until her baby was 7 months old. Support given. I offer to assist and Mom is receptive. Mom says she does have questions about breast feeding twins.      Jg GOODWIN,IBCLC

## 2022-12-22 NOTE — L&D DELIVERY NOTE
Department of Obstetrics and Gynecology  Spontaneous Vaginal Delivery Note    Labor & Delivery Summary  Labor Onset Date: 22    Pre-operative Diagnosis:   Monochorionic diamniotic twin delivery at 42w 2d    Post-operative Diagnosis:  Same + live female x 2    Procedure:  Spontaneous vaginal delivery x 2    Surgeon:  Zach Quintana MD    Information for the patient's :  Belinda Liz [[id number]]      Information for the patient's :  Erik Damon [[id number]]        Anesthesia:  epidural anesthesia    Estimated blood loss:  300ml    Specimen:  Placenta sent to pathology     Cord blood sent No    Complications:  none    Condition:  infants stable to general nursery    Details of Procedure: The patient is a 39 y.o. female at 42w2d   OB History          2    Para   1    Term   1            AB   0    Living   1         SAB   0    IAB        Ectopic        Molar        Multiple        Live Births   1             who was admitted for active phase labor. She received the following interventions: ARBOW and IV Pitocin augmentation. The patient progressed well,did receive an epidural, became complete and started to push. Patient delivered twin A and Cord was clamped and cut and infant was placed on mother abdomen and then to the waiting nurse for evaluation. Patient delivered twin B and Cord was clamped and cut and infant was placed on mother abdomen and then to the waiting nurse for evaluation. The delivery of the placenta was spontaneous and intact. The perineum and vagina were explored and a first degree laceration was repaired in standard fashion. Delivery of both twins were by Kendy Quintana CNM under my direct supervision. Placenta delivery and first degree repair were by myself.

## 2022-12-22 NOTE — LACTATION NOTE
Visited and assisted with breast feeding. Mom decides to breast feed babies individually at this feeding. Babies each awaken with a diaper change. Baby B latches with some assistance and nurses well. Baby A also latches with some assistance and then nurses steady. Mom demonstrates good technique.       Jg RN,IBCLC

## 2022-12-22 NOTE — PROGRESS NOTES
Department of Obstetrics and Gynecology  Labor and Delivery   Post Partum Progress Note      SUBJECTIVE:  Doing well with no complaints. Reports bleeding is decreasing and pain is well controlled with medication. Has voided without difficulty. Has not had BM, but + flatus. Eating and drinking well. Denies HA/visual changes/epigastric pain. Breastfeeding is going well. Reports good social support. Denies emotional concerns. OBJECTIVE:      Vitals:  /61   Pulse 64   Temp 98.9 °F (37.2 °C) (Oral)   Resp 16   Ht 4' 10\" (1.473 m)   Wt 122 lb (55.3 kg)   SpO2 100%   Breastfeeding Unknown   BMI 25.50 kg/m²   Lab Results   Component Value Date    WBC 10.6 (H) 2022    HGB 13.7 2022    HCT 41.8 2022    MCV 96.1 2022     2022       ABDOMEN:  Soft, non-tender. Fundus firm at u-1. BS present x 4 quadrants. LOCHIA: Normal per pt  LUNGS: CTAB  HEART: RRR  EXTREMITIES: No calf tenderness, erythema or swelling bilaterally       ASSESSMENT:      PPD # 1  S/p   Breastfeeding well  GBS Negative  RH O+/O+/O+    PLAN:     Will continue with PP POC   Will plan for discharge on PPD2.         JANE Chambers CNM

## 2022-12-22 NOTE — FLOWSHEET NOTE
Dr. Sandra Lopez at bedside VE complete. [address] off to OR1 via bed for delivery. FHR A-145 B-145.  1853 FHR A-150 B-140 This RN, CNM and OB Dr. Mayank Whitehead at bedside pushing well. 46 Baby A Viable baby girl delivered see delivery record. 1900 FHR-B 145 pushing continues. 1905 FHR-B 120 Pushing continues. [address] viable baby girl delivered over 1 degree laceration. See delivery record.

## 2022-12-22 NOTE — LACTATION NOTE
Visited and assisted with breast feeding. Mom has been breastfeeding tandem. Assisted to position the pillows and bring the twins closer. Each twin latches with some assistance and then suckle in spurts. I discussed a correct latch and head support to avoid extreme nipple soreness as she experienced with her first baby. Babies  then nurse with a steady suck pattern. Teaching reviewed with parents: feeding POC, feeding cues, feeding log sheets, expected output, weight loss/gain, breast and nipple care and STS. Different positions discussed. Various questions answered. Mom is encouraged to call for assistance JORGE Gregorio RN,IBCLC

## 2022-12-22 NOTE — FLOWSHEET NOTE
Patient complaining of lower abdominal pain and back pain that increases during breastfeeding. Educated pt on pain during breastfeeding. Advised patient that she can have pain medication when needed. Patient states that she will let me know when she wants it. Patient wincing in pain during breastfeeding. Educated patient regarding maintaining a schedule with pain medication to avoid pain getting too intense. Patient agreeable to this.

## 2022-12-23 VITALS
DIASTOLIC BLOOD PRESSURE: 64 MMHG | BODY MASS INDEX: 25.61 KG/M2 | RESPIRATION RATE: 16 BRPM | SYSTOLIC BLOOD PRESSURE: 112 MMHG | HEART RATE: 69 BPM | OXYGEN SATURATION: 100 % | WEIGHT: 122 LBS | TEMPERATURE: 98.4 F | HEIGHT: 58 IN

## 2022-12-23 PROCEDURE — 6370000000 HC RX 637 (ALT 250 FOR IP)

## 2022-12-23 RX ORDER — IBUPROFEN 800 MG/1
800 TABLET ORAL EVERY 8 HOURS
Qty: 40 TABLET | Refills: 1 | Status: SHIPPED | OUTPATIENT
Start: 2022-12-23

## 2022-12-23 RX ORDER — PSEUDOEPHEDRINE HCL 30 MG
100 TABLET ORAL 2 TIMES DAILY
Qty: 30 CAPSULE | Refills: 1 | Status: SHIPPED | OUTPATIENT
Start: 2022-12-23

## 2022-12-23 RX ADMIN — ACETAMINOPHEN 1000 MG: 500 TABLET ORAL at 10:02

## 2022-12-23 RX ADMIN — IBUPROFEN 800 MG: 800 TABLET, FILM COATED ORAL at 05:15

## 2022-12-23 RX ADMIN — IBUPROFEN 800 MG: 800 TABLET, FILM COATED ORAL at 13:04

## 2022-12-23 ASSESSMENT — PAIN SCALES - GENERAL
PAINLEVEL_OUTOF10: 3
PAINLEVEL_OUTOF10: 4
PAINLEVEL_OUTOF10: 5
PAINLEVEL_OUTOF10: 3

## 2022-12-23 ASSESSMENT — PAIN DESCRIPTION - DESCRIPTORS
DESCRIPTORS: CRAMPING

## 2022-12-23 ASSESSMENT — PAIN - FUNCTIONAL ASSESSMENT
PAIN_FUNCTIONAL_ASSESSMENT: ACTIVITIES ARE NOT PREVENTED

## 2022-12-23 ASSESSMENT — PAIN DESCRIPTION - ORIENTATION
ORIENTATION: ANTERIOR;LOWER
ORIENTATION: ANTERIOR;LOWER
ORIENTATION: LOWER
ORIENTATION: LOWER

## 2022-12-23 ASSESSMENT — PAIN DESCRIPTION - LOCATION
LOCATION: ABDOMEN

## 2022-12-23 ASSESSMENT — PAIN DESCRIPTION - FREQUENCY: FREQUENCY: INTERMITTENT

## 2022-12-23 ASSESSMENT — PAIN DESCRIPTION - ONSET: ONSET: GRADUAL

## 2022-12-23 NOTE — DISCHARGE SUMMARY
Obstetrical Discharge Form    Gestational Age:  42w2d    Antepartum complications: Twin pregnancy    Date of Delivery:   2022      Type of Delivery:   vaginal, spontaneous    Delivered By:   George Estrada MD             Baby:       Information for the patient's :  Warden Semaj Valdez [[id number]]      Information for the patient's :  Warden Semaj Renner [[id number]]      Anesthesia:    Epidural    Intrapartum complications: None    Feeding method:   breast    Postpartum complications: none    Discharge Date:   2022    Condition of discharge:  good    Plan:   Follow up    in 6 week(s)

## 2022-12-23 NOTE — LACTATION NOTE
Visited. Mom says babies are breast feeding well. She does c/o nipple soreness and she requests a nipple shield. I discussed correct positioning and latch on, and sore nipple care. A nipple shield and breast shells are given with instructions. Mom says she does know the proper application of the nipple shield. Further teaching reviewed: breast engorgement and relief, as well as signs of mastitis. Mom denies other concerns, but I do encourage her to call me PRN.      Jg GOODWIN,IBCLC

## 2022-12-23 NOTE — PROGRESS NOTES
Department of Obstetrics and Gynecology  Labor and Delivery   Post Partum Progress Note      SUBJECTIVE:  Doing well with no complaints. Reports bleeding is decreasing and pain is well controlled with medication. Has voided without difficulty. Has not had BM, but + flatus. Eating and drinking well. Denies HA/visual changes/epigastric pain. Breastfeeding is going well. Reports good social support. Denies emotional concerns. OBJECTIVE:      Vitals:  /70   Pulse 66   Temp 98.4 °F (36.9 °C) (Oral)   Resp 18   Ht 4' 10\" (1.473 m)   Wt 122 lb (55.3 kg)   SpO2 100%   Breastfeeding Unknown   BMI 25.50 kg/m²   Lab Results   Component Value Date    WBC 10.6 (H) 2022    HGB 13.7 2022    HCT 41.8 2022    MCV 96.1 2022     2022       ABDOMEN:  Soft, non-tender. Fundus firm at u-1. BS present x 4 quadrants. LOCHIA: Normal per pt  LUNGS: CTAB  HEART: RRR  EXTREMITIES: No calf tenderness, erythema or swelling bilaterally       ASSESSMENT:      PPD # 2  S/p   Breastfeeding well  RH +    PLAN:     Will plan for discharge today. Discharge teaching completed including counseling on warning signs (heavy vaginal bleeding, s/s of preeclampsia, fever >100.4, ACHES, s/s of PPD). Rx for colace and ibuprofen. Pt to schedule f/u pp visit at 6 weeks in the office.        JANE Simeon CNM

## 2022-12-23 NOTE — DISCHARGE INSTRUCTIONS
Discharge Instructions    Thank you for letting us care for you and your family. The following are  discharge instructions for yourself and your baby. If you have any questions once you have arrived home please feel free to contact the [facility] at [phone]. MOM INSTRUCTIONS    DIET    Eat a well balanced diet focusing on foods high in fiber and protein. Drink plenty of fluids (  8 to 10 glasses a day) especially water. To avoid constipation you may take a mild stool softener as recommended by your doctor or midwife. ACTIVITY    Gradually increase your activity. Resume your exercise regimen only after advised by you doctor or midwife. Avoid lifting anything heavier than your baby for 6 weeks or until otherwise advised by your doctor or midwife. Avoid driving for 2 weeks or if taking narcotics. Climb stairs one at a time. Use caution when carrying your baby up and down the stairs. NO SEXUAL ACTIVITY and nothing in your vagina( tampons or douching) for 4 to 6 weeks or until advised by your doctor or midwife. Be prepared to discuss family planning at your follow-up OB visit. You may feel tired or have a lack of energy. Nap when the baby naps to catch up on your sleep. You may continue to take prenatal vitamin to replenish nutrients post delivery as directed by your doctor or midwife. EMOTIONS    You may feel sad, teary, overwhelmed and yañez. Contact your OB provider if symptoms worsen or last more than 2 weeks. Contact your OB provider if you have thoughts of harming yourself or your baby. If your baby will not stop crying and you are feeling overwhelmed, place your baby in a crib on their back and contact another adult for help. NEVER SHAKE A BABY. BLEEDING    Your vaginal bleeding will decrease in amount over the next 2 to 6 weeks. You will notice that as your activity increases your flow may increase.  This is your body's way of telling you to take it easy and rest more.  If you saturate more than one maxi pad in an hour or you pass a clot larger than a lemon and resting does not help the flow slow down , call your OB doctor or midwife. If your bleeding has a foul odor call you doctor or midwife. BREAST CARE    For breastfeeding moms:  Refer to your breastfeeding booklet provided by the hospital if you have any questions. If you become engorged,feeding may be more difficult or painful for 1 to 2 days. You may find it helpful to express some milk before feeding so that the infant can latch on more easily. Continue to take your prenatal vitamins as directed by your doctor or midwife while you are breastfeeding. For any questions or concerns, contact our Lactation Consultant at [phone]. For non-breastfeeding moms:  You may apply ice packs to your breasts over your bra for twenty minutes at a time for comfort. Avoid stimulation to your breasts. When showering allow the water to strike your back not your breasts. Do not express your milk. Wear a good fitting bra. INCISIONAL CARE    Clean your incision in the shower with mild soap. After your shower pat the incision dry and keep the area open to the air. If used, steri-strips should be removed by 2 weeks. If used,staples should be removed by the OB's office in 1 week. If ordered, an abdominal binder may provide support for your incision. Have some one check your incision ever day for swelling,bleeding,drainage,foul odor,redness and that the edges are approximated. If your incision has any of the above,notify you doctor or midwife. PERINEAL CARE    Use the meghann-bottle every time after you use the toilet. Cleanse your perineum from front to back. If you had stitches in your perineum,they will dissolve in 4 to 6 weeks. You may use a sitz bath and Tucks pads as directed and as needed for comfort. SWELLING    Try to keep your legs elevated when you are sitting.   When lying down keep your legs elevated. When wearing stockings or socks,make sure they are not too tight. WHEN TO CALL THE DOCTOR    If you have a temperature of 100.6 degrees or higher. If your bleeding has increased and your are soaking a maxi-pad in an hour. If your abdomen is tender to touch. If you are passing blood clots bigger than the size of a lemon. If you are experiencing extreme weakness or dizziness. If you have are having flu-like symptoms such as achy joints and muscles. If there is a foul smell or a green color to your vaginal bleeding. If you have pain that can not be relieved. If you have persistent burning with urination or frequent urination. If you have concerns about your well-being. If you have a warm,firm, red lump in your breast.  If you are unable to sleep,eat, or are having thoughts of harming yourself or the baby. If you have a red,warm, tender area in your calf. If you have swelling, bleeding, drainage,foul odor,redness or warmth in or around your incision or stitches. [id number] [facility] [address]  [phone]      [address] [address] [phone]                              I verify that I have received the above information,that I have reviewed it and that I have no further questions. [employer] has provided me with the opportunity to view instructional videos pertaining to care of myself and my baby. I will share this information with all caregivers for my child(jori). I feel confident to care for myself and my baby. Thank you for the opportunity to care for you and your family! After Your Delivery Discharge Instructions      What to do after you leave the hospital:  Recommended diet: regular diet    Recommended activity: No driving for 2 weeks, no sex or tampon use for 6 weeks. No douching. No heavy lifting for 6 weeks. Frequent ambulation with stairs as tolerated. You may return to work in 6 weeks. Follow-up in in 6 weeks. After your delivery - signs and symptoms to watch for:  Fever - Oral temperature greater than 100.4 degrees Fahrenheit  Foul-smelling vaginal discharge  Headache unrelieved by \"pain meds\"  Difficulty urinating  Breasts reddened, hard, hot to the touch  Nipple discharge which is foul-smelling or contains pus  Increased pain at the site of the episiotomy  Difficulty breathing with or without chest pain  New calf pain especially if only on one side  Sudden, continuing increased vaginal bleeding with or without clots  Unrelieved feelings of:   Inability to cope  Sadness  Anxiety  Lack of interest in baby  Insomnia  Crying     What to do at home:  Resume activity gradually   Don't lift anything heavier than baby and carrier until OK'd by your Physician   No sex until OK'd by your Physician  Eat regular nutritious meals  Take pain medication as prescribed whenever you need them  To avoid/relieve constipation take stool softeners if advised   Increase fiber in your diet

## 2022-12-23 NOTE — PLAN OF CARE
Problem: Pain  Goal: Verbalizes/displays adequate comfort level or baseline comfort level  12/23/2022 0001 by Kaleb Hartley RN  Outcome: Progressing  Flowsheets (Taken 12/22/2022 2205)  Verbalizes/displays adequate comfort level or baseline comfort level:   Assess pain using appropriate pain scale   Administer analgesics based on type and severity of pain and evaluate response   Implement non-pharmacological measures as appropriate and evaluate response   Consider cultural and social influences on pain and pain management  12/22/2022 1556 by Casey Rodriguez RN  Outcome: Progressing     Problem: Infection - Adult  Goal: Absence of infection at discharge  12/22/2022 1556 by Casey Rodriguez RN  Outcome: Progressing  Goal: Absence of infection during hospitalization  12/23/2022 0001 by Kaleb Hartley RN  Outcome: Progressing  12/22/2022 1556 by Casey Rodriguez RN  Outcome: Progressing  Goal: Absence of fever/infection during anticipated neutropenic period  12/23/2022 0001 by Kaleb Hartley RN  Outcome: Progressing  12/22/2022 1556 by Casey Rodriguez RN  Outcome: Progressing     Problem: Safety - Adult  Goal: Free from fall injury  12/23/2022 0001 by Kaleb Hartley RN  Outcome: Progressing  12/22/2022 1556 by Casey Rodriguez RN  Outcome: Progressing     Problem: Chronic Conditions and Co-morbidities  Goal: Patient's chronic conditions and co-morbidity symptoms are monitored and maintained or improved  12/23/2022 0001 by Kaleb Hartley RN  Outcome: Progressing  12/22/2022 1556 by Casey Rodriguez RN  Outcome: Progressing

## 2022-12-23 NOTE — FLOWSHEET NOTE
ID bands checked. Infant tTwin A and \"Twin B\" ID bands removed and stapled to footprint sheets, the mother verified as correct, signed and witnessed by RN x 2. Hugs tags removed. Discharge instructions given and reviewed. Rx's given. Mother ambulating well at discharge with pain #0. Father of baby driving mother and baby home. Mother verbalizes understanding to follow-up with Pediatric Provider, [facility] pop [facility], Martha pop in 2-3 days for baby's first check up. Mother states she has safe crib for baby at home and has signed \"Safe Sleep\" paperwork verifying this. Twin A & Twin B both pink, without distress, harnessed into carseats at discharge. Mother signed & understiood both babies passing carseat challenges.  Carseat testing both onTwin

## 2022-12-23 NOTE — LACTATION NOTE
Visited- Mom has questions about taking the MMR vaccine while breast feeding. Mom is advised of literature stating that this vaccine is compatible with breast feeding according to \"Saúl Smyth's Medication and Mother's Milk\" reference. Mom voices understanding, but says she probably will still decline the vaccine. Mom declines other questions. Preparing for discharge today. She is encouraged to call JORGE Gregorio RN,IBCLC

## 2022-12-24 NOTE — FLOWSHEET NOTE
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 Pt refused MMR immunization stating she was concerned of passage through breast milk to twins. Garcia Palacios (lactation Consultant) spoke to Pt. Pt refused MMR immunization at this time.

## 2022-12-24 NOTE — FLOWSHEET NOTE
ID bands checked. Infant tTwin A and \"Twin B\" ID bands removed and stapled to footprint sheets, the mother verified as correct, signed and witnessed by RN x 2. Hugs tags removed. Discharge instructions given and reviewed. Rx's given. Mother ambulating well at discharge with pain #0. Father of baby driving mother and baby home. Mother verbalizes understanding to follow-up with Pediatric Provider, Alberta Osei [facility], Alberta Osei in 2-3 days for baby's first check up. Mother states she has safe crib for baby at home and has signed \"Safe Sleep\" paperwork verifying this. Twin A & Twin B both pink, without distress, harnessed into carseats at discharge. Mother signed & understiood both babies passing carseat challenges.  Carseat testing both onTwin

## 2022-12-24 NOTE — FLOWSHEET NOTE
AM assessment complete. Bilateral breath sounds clear on auscultation. Abdomen soft, fundus firm with little rubra. 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 Pt refused MMR immunization stating she was concerned of passage through breast milk to twins. Kam Rawls (lactation Consultant) spoke to Pt. Pt refused MMR immunization at this time. Ambulating well, using lanolin nipple cream ~ discussed plan of care.

## 2024-01-25 ENCOUNTER — OFFICE VISIT (OUTPATIENT)
Dept: OBGYN | Age: 39
End: 2024-01-25

## 2024-01-25 VITALS
BODY MASS INDEX: 20.36 KG/M2 | DIASTOLIC BLOOD PRESSURE: 78 MMHG | SYSTOLIC BLOOD PRESSURE: 127 MMHG | WEIGHT: 97 LBS | HEIGHT: 58 IN

## 2024-01-25 DIAGNOSIS — R51.9 HEADACHE DISORDER: ICD-10-CM

## 2024-01-25 DIAGNOSIS — N92.6 IRREGULAR MENSES: Primary | ICD-10-CM

## 2024-01-25 PROCEDURE — 99203 OFFICE O/P NEW LOW 30 MIN: CPT | Performed by: OBSTETRICS & GYNECOLOGY

## 2024-01-25 PROCEDURE — 36415 COLL VENOUS BLD VENIPUNCTURE: CPT | Performed by: OBSTETRICS & GYNECOLOGY

## 2024-01-25 RX ORDER — BUTALBITAL, ACETAMINOPHEN AND CAFFEINE 50; 325; 40 MG/1; MG/1; MG/1
1 TABLET ORAL EVERY 4 HOURS PRN
Qty: 15 TABLET | Refills: 3 | Status: SHIPPED | OUTPATIENT
Start: 2024-01-25

## 2024-01-25 ASSESSMENT — PATIENT HEALTH QUESTIONNAIRE - PHQ9
SUM OF ALL RESPONSES TO PHQ QUESTIONS 1-9: 0
SUM OF ALL RESPONSES TO PHQ QUESTIONS 1-9: 0
2. FEELING DOWN, DEPRESSED OR HOPELESS: 0
SUM OF ALL RESPONSES TO PHQ9 QUESTIONS 1 & 2: 0
SUM OF ALL RESPONSES TO PHQ QUESTIONS 1-9: 0
SUM OF ALL RESPONSES TO PHQ QUESTIONS 1-9: 0
1. LITTLE INTEREST OR PLEASURE IN DOING THINGS: 0

## 2024-01-25 NOTE — PROGRESS NOTES
flat.      Palpations: Abdomen is soft.   Musculoskeletal:      Cervical back: Normal range of motion.   Skin:     General: Skin is warm.   Neurological:      Mental Status: She is alert.         No results found for this visit on 01/25/24.    ASSESSMENT AND PLAN   Diagnosis Orders   1. Irregular menses  HCG, Quantitative, Pregnancy    HCG, Quantitative, Pregnancy    CBC    US NON OB TRANSVAGINAL      2. Headache disorder  butalbital-acetaminophen-caffeine (FIORICET, ESGIC) -40 MG per tablet          Reviewed past medical and obstetrical hx. Labs ordered today and Monday also ultrasound and follow up Wednesday. Fioricet sent to pharmacy for headaches. Chaperone Blanche Hernandez was present for the entire appointment.        Return for ultrasound, follow up appointment.    Massimo Meredith MD

## 2024-01-26 LAB
B-HCG SERPL EIA 3RD IS-ACNC: 21 MIU/ML
DEPRECATED RDW RBC AUTO: 12.1 % (ref 12.4–15.4)
HCT VFR BLD AUTO: 41.4 % (ref 36–48)
HGB BLD-MCNC: 14 G/DL (ref 12–16)
MCH RBC QN AUTO: 30.3 PG (ref 26–34)
MCHC RBC AUTO-ENTMCNC: 33.9 G/DL (ref 31–36)
MCV RBC AUTO: 89.4 FL (ref 80–100)
PLATELET # BLD AUTO: 391 K/UL (ref 135–450)
PMV BLD AUTO: 7.7 FL (ref 5–10.5)
RBC # BLD AUTO: 4.63 M/UL (ref 4–5.2)
WBC # BLD AUTO: 9 K/UL (ref 4–11)

## 2024-01-29 ENCOUNTER — NURSE ONLY (OUTPATIENT)
Dept: OBGYN | Age: 39
End: 2024-01-29

## 2024-01-29 DIAGNOSIS — N92.6 IRREGULAR MENSES: ICD-10-CM

## 2024-01-29 LAB — B-HCG SERPL EIA 3RD IS-ACNC: 5.4 MIU/ML

## 2024-01-29 PROCEDURE — 36415 COLL VENOUS BLD VENIPUNCTURE: CPT | Performed by: OBSTETRICS & GYNECOLOGY

## 2024-01-31 ENCOUNTER — OFFICE VISIT (OUTPATIENT)
Dept: OBGYN | Age: 39
End: 2024-01-31

## 2024-01-31 VITALS
WEIGHT: 96 LBS | SYSTOLIC BLOOD PRESSURE: 101 MMHG | DIASTOLIC BLOOD PRESSURE: 62 MMHG | HEIGHT: 58 IN | BODY MASS INDEX: 20.15 KG/M2

## 2024-01-31 DIAGNOSIS — O03.9 COMPLETE ABORTION: Primary | ICD-10-CM

## 2024-01-31 PROCEDURE — 99213 OFFICE O/P EST LOW 20 MIN: CPT | Performed by: OBSTETRICS & GYNECOLOGY

## 2024-01-31 NOTE — PROGRESS NOTES
24    Courtney Rocha  1985    Chief Complaint   Patient presents with    Follow-up     Pt here to f/u on labs and u/s. Quant  21.0, 24 5.4.   Pt denies bleeding or pain. Pt states headaches have improved with medications.         Courtney Rocha is a 38 y.o. female who presents today for evaluation of see above.    Past Medical History:   Diagnosis Date    Diabetes mellitus (HCC)        No past surgical history on file.    Social History     Tobacco Use    Smoking status: Never    Smokeless tobacco: Never   Vaping Use    Vaping Use: Never used   Substance Use Topics    Alcohol use: Not Currently    Drug use: Never       Family History   Problem Relation Age of Onset    Heart Disease Mother     Liver Disease Father     Hypertension Paternal Grandfather        Current Outpatient Medications   Medication Sig Dispense Refill    butalbital-acetaminophen-caffeine (FIORICET, ESGIC) -40 MG per tablet Take 1 tablet by mouth every 4 hours as needed for Headaches 15 tablet 3    Prenatal MV-Min-Fe Fum-FA-DHA (PRENATAL 1 PO) Take by mouth       No current facility-administered medications for this visit.       No Known Allergies        There is no immunization history for the selected administration types on file for this patient.    Review of Systems  All other systems reviewed and are negative    /62   Ht 1.473 m (4' 10\")   Wt 43.5 kg (96 lb)   BMI 20.06 kg/m²     Physical Exam    No results found for this visit on 24.    ASSESSMENT AND PLAN   Diagnosis Orders   1. Complete             Discussed HCG decreasing, ultrasound normal today. Discussed contraceptives today. Chaperone Iraida Jacques was present for the entire appointment.       Return for Annual examination.    Massimo Meredith MD

## 2024-04-03 ENCOUNTER — HOSPITAL ENCOUNTER (OUTPATIENT)
Age: 39
Setting detail: SPECIMEN
Discharge: HOME | End: 2024-04-03

## 2024-04-03 ENCOUNTER — OFFICE VISIT (OUTPATIENT)
Dept: OBGYN | Age: 39
End: 2024-04-03

## 2024-04-03 VITALS
DIASTOLIC BLOOD PRESSURE: 73 MMHG | SYSTOLIC BLOOD PRESSURE: 123 MMHG | WEIGHT: 99 LBS | BODY MASS INDEX: 20.78 KG/M2 | HEIGHT: 58 IN

## 2024-04-03 DIAGNOSIS — N75.0 BARTHOLIN GLAND CYST: ICD-10-CM

## 2024-04-03 DIAGNOSIS — Z01.419 ENCOUNTER FOR WELL WOMAN EXAM WITH ROUTINE GYNECOLOGICAL EXAM: Primary | ICD-10-CM

## 2024-04-03 PROCEDURE — 88142 CYTOPATH C/V THIN LAYER: CPT

## 2024-04-03 PROCEDURE — 99385 PREV VISIT NEW AGE 18-39: CPT | Performed by: OBSTETRICS & GYNECOLOGY

## 2024-04-03 PROCEDURE — 87624 HPV HI-RISK TYP POOLED RSLT: CPT

## 2024-04-03 PROCEDURE — 87801 DETECT AGNT MULT DNA AMPLI: CPT

## 2024-04-03 SDOH — ECONOMIC STABILITY: FOOD INSECURITY: WITHIN THE PAST 12 MONTHS, YOU WORRIED THAT YOUR FOOD WOULD RUN OUT BEFORE YOU GOT MONEY TO BUY MORE.: NEVER TRUE

## 2024-04-03 SDOH — ECONOMIC STABILITY: INCOME INSECURITY: HOW HARD IS IT FOR YOU TO PAY FOR THE VERY BASICS LIKE FOOD, HOUSING, MEDICAL CARE, AND HEATING?: NOT VERY HARD

## 2024-04-03 SDOH — ECONOMIC STABILITY: FOOD INSECURITY: WITHIN THE PAST 12 MONTHS, THE FOOD YOU BOUGHT JUST DIDN'T LAST AND YOU DIDN'T HAVE MONEY TO GET MORE.: NEVER TRUE

## 2024-04-03 SDOH — ECONOMIC STABILITY: HOUSING INSECURITY
IN THE LAST 12 MONTHS, WAS THERE A TIME WHEN YOU DID NOT HAVE A STEADY PLACE TO SLEEP OR SLEPT IN A SHELTER (INCLUDING NOW)?: NO

## 2024-04-04 NOTE — PROGRESS NOTES
24    Courtney Rocha  1985    Chief Complaint   Patient presents with    Gynecologic Exam     Pt here for annual, is sexually active, regular menses, LMP-3/18/2024, bc-none, pap--neg.     Other     Pt c/o vulvar cyst x 5 wks, denies having any pain.         Courtney Rocha is a 38 y.o. female who presents today for evaluation of see above.    Past Medical History:   Diagnosis Date    Diabetes mellitus (HCC)     gestational    Migraine     Missed      Vulvar cyst        No past surgical history on file.    Social History     Tobacco Use    Smoking status: Never    Smokeless tobacco: Never   Vaping Use    Vaping Use: Never used   Substance Use Topics    Alcohol use: Not Currently    Drug use: Never       Family History   Problem Relation Age of Onset    Heart Disease Mother     Liver Disease Father     Hypertension Paternal Grandfather        Current Outpatient Medications   Medication Sig Dispense Refill    butalbital-acetaminophen-caffeine (FIORICET, ESGIC) -40 MG per tablet Take 1 tablet by mouth every 4 hours as needed for Headaches (Patient not taking: Reported on 4/3/2024) 15 tablet 3    Prenatal MV-Min-Fe Fum-FA-DHA (PRENATAL 1 PO) Take by mouth (Patient not taking: Reported on 4/3/2024)       No current facility-administered medications for this visit.       No Known Allergies        There is no immunization history for the selected administration types on file for this patient.    Review of Systems  All other systems reviewed and are negative    /73 (Site: Right Upper Arm, Position: Sitting, Cuff Size: Medium Adult)   Ht 1.473 m (4' 10\")   Wt 44.9 kg (99 lb)   LMP 2024   BMI 20.69 kg/m²     Physical Exam  Nursing note reviewed. Exam conducted with a chaperone present.   HENT:      Head: Normocephalic.      Nose: Nose normal.   Eyes:      Extraocular Movements: Extraocular movements intact.   Pulmonary:      Effort: Pulmonary effort is normal.   Abdominal:

## 2024-04-06 LAB
C TRACH RRNA SPEC QL NAA+PROBE: NEGATIVE
HPV HIGH RISK: NOT DETECTED
N GONORRHOEA RRNA SPEC QL NAA+PROBE: NEGATIVE

## 2025-06-16 ENCOUNTER — OFFICE VISIT (OUTPATIENT)
Dept: OBGYN | Age: 40
End: 2025-06-16

## 2025-06-16 VITALS
DIASTOLIC BLOOD PRESSURE: 62 MMHG | WEIGHT: 103 LBS | SYSTOLIC BLOOD PRESSURE: 110 MMHG | HEIGHT: 58 IN | BODY MASS INDEX: 21.62 KG/M2 | HEART RATE: 79 BPM

## 2025-06-16 DIAGNOSIS — O02.1 MISSED ABORTION: ICD-10-CM

## 2025-06-16 DIAGNOSIS — Z01.419 ENCOUNTER FOR WELL WOMAN EXAM WITH ROUTINE GYNECOLOGICAL EXAM: Primary | ICD-10-CM

## 2025-06-16 PROCEDURE — 99396 PREV VISIT EST AGE 40-64: CPT | Performed by: OBSTETRICS & GYNECOLOGY

## 2025-06-16 RX ORDER — MISOPROSTOL 200 UG/1
600 TABLET ORAL 4 TIMES DAILY
Qty: 24 TABLET | Refills: 0 | Status: SHIPPED | OUTPATIENT
Start: 2025-06-16 | End: 2025-06-17

## 2025-06-16 SDOH — ECONOMIC STABILITY: FOOD INSECURITY: WITHIN THE PAST 12 MONTHS, YOU WORRIED THAT YOUR FOOD WOULD RUN OUT BEFORE YOU GOT MONEY TO BUY MORE.: NEVER TRUE

## 2025-06-16 SDOH — ECONOMIC STABILITY: FOOD INSECURITY: WITHIN THE PAST 12 MONTHS, THE FOOD YOU BOUGHT JUST DIDN'T LAST AND YOU DIDN'T HAVE MONEY TO GET MORE.: NEVER TRUE

## 2025-06-16 ASSESSMENT — PATIENT HEALTH QUESTIONNAIRE - PHQ9
2. FEELING DOWN, DEPRESSED OR HOPELESS: NOT AT ALL
SUM OF ALL RESPONSES TO PHQ QUESTIONS 1-9: 0
SUM OF ALL RESPONSES TO PHQ QUESTIONS 1-9: 0
1. LITTLE INTEREST OR PLEASURE IN DOING THINGS: NOT AT ALL
SUM OF ALL RESPONSES TO PHQ QUESTIONS 1-9: 0
SUM OF ALL RESPONSES TO PHQ QUESTIONS 1-9: 0

## 2025-06-16 NOTE — PROGRESS NOTES
25    Courtney Rocha  1985    Chief Complaint   Patient presents with    Other     6/10/25 u/s d/t MAB    Annual Exam     Annual exam. Non-smoker No known h/o dvt. LMP: 2025. Periods are irregular.Patient is sexually active. Patient is not on birth control. Pap Smear was  4/3/24 and results were negativeHPV negative. Patient has not had a recent mammogram. Patient complains of spotting on Saturday with cramping mostly at night. Pt c/o bartholin cyst , denies itching, pain, or change in size        Courtney Rocha is a 39 y.o. female who presents today for evaluation of see above.    Past Medical History:   Diagnosis Date    Diabetes mellitus (HCC)     gestational    Migraine     Missed      Vulvar cyst        No past surgical history on file.    Social History     Tobacco Use    Smoking status: Never    Smokeless tobacco: Never   Vaping Use    Vaping status: Never Used   Substance Use Topics    Alcohol use: Not Currently    Drug use: Never       Family History   Problem Relation Age of Onset    Heart Disease Mother     Liver Disease Father     Hypertension Paternal Grandfather        Current Outpatient Medications   Medication Sig Dispense Refill    miSOPROStol (CYTOTEC) 200 MCG tablet Place 3 tablets vaginally 4 times daily for 4 doses 24 tablet 0    butalbital-acetaminophen-caffeine (FIORICET, ESGIC) -40 MG per tablet Take 1 tablet by mouth every 4 hours as needed for Headaches (Patient not taking: Reported on 4/3/2024) 15 tablet 3    Prenatal MV-Min-Fe Fum-FA-DHA (PRENATAL 1 PO) Take by mouth (Patient not taking: Reported on 4/3/2024)       No current facility-administered medications for this visit.       No Known Allergies        There is no immunization history for the selected administration types on file for this patient.    Review of Systems  All other systems reviewed and are negative    /62 (BP Site: Right Upper Arm, Patient Position: Sitting, BP Cuff Size: Small

## 2025-07-02 ENCOUNTER — OFFICE VISIT (OUTPATIENT)
Dept: OBGYN | Age: 40
End: 2025-07-02

## 2025-07-02 VITALS
SYSTOLIC BLOOD PRESSURE: 96 MMHG | HEART RATE: 91 BPM | WEIGHT: 100 LBS | HEIGHT: 58 IN | BODY MASS INDEX: 20.99 KG/M2 | DIASTOLIC BLOOD PRESSURE: 67 MMHG

## 2025-07-02 DIAGNOSIS — O03.9 SAB (SPONTANEOUS ABORTION): Primary | ICD-10-CM

## 2025-07-02 PROCEDURE — 99213 OFFICE O/P EST LOW 20 MIN: CPT | Performed by: OBSTETRICS & GYNECOLOGY

## 2025-07-02 NOTE — PROGRESS NOTES
25    Courtney Rocha  1985    Chief Complaint   Patient presents with    Other     MAB, US . C/o spotting, only using panty liners.         Courtney Rocha is a 39 y.o. female who presents today for evaluation of see above.    Past Medical History:   Diagnosis Date    Diabetes mellitus (HCC)     gestational    Migraine     Missed      Vulvar cyst        No past surgical history on file.    Social History     Tobacco Use    Smoking status: Never    Smokeless tobacco: Never   Vaping Use    Vaping status: Never Used   Substance Use Topics    Alcohol use: Not Currently    Drug use: Never       Family History   Problem Relation Age of Onset    Heart Disease Mother     Liver Disease Father     Hypertension Paternal Grandfather        Current Outpatient Medications   Medication Sig Dispense Refill    miSOPROStol (CYTOTEC) 200 MCG tablet Place 3 tablets vaginally 4 times daily for 4 doses 24 tablet 0    butalbital-acetaminophen-caffeine (FIORICET, ESGIC) -40 MG per tablet Take 1 tablet by mouth every 4 hours as needed for Headaches (Patient not taking: Reported on 2025) 15 tablet 3    Prenatal MV-Min-Fe Fum-FA-DHA (PRENATAL 1 PO) Take by mouth (Patient not taking: Reported on 2025)       No current facility-administered medications for this visit.       No Known Allergies        There is no immunization history for the selected administration types on file for this patient.    Review of Systems  All other systems reviewed and are negative    BP 96/67 (BP Site: Right Upper Arm, Patient Position: Sitting, BP Cuff Size: Large Adult)   Pulse 91   Ht 1.473 m (4' 10\")   Wt 45.4 kg (100 lb)   LMP 2025   BMI 20.90 kg/m²     Physical Exam    No results found for this visit on 25.    ASSESSMENT AND PLAN   Diagnosis Orders   1. SAB (spontaneous )            US normal today  Bleeding resolving    Data Unavailable     No follow-ups on file.    Massimo Meredith MD

## 2025-07-08 NOTE — LACTATION NOTE
Called and spoke with patient. Notified them that Dr. Stallings ordered a new prescription and verified pharmacy. Notified patient that lab work also should be completed. Patient verbalized understanding.     This note was copied from a baby's chart. Visited and assisted with breast feeding. Baby awakens with a diaper change,latches with some assistance and nurses steady with stimulation. Mom breast feeds babies individually this feeding - she demonstrates good technique.  Jg GOODWIN,IBCLC